# Patient Record
Sex: FEMALE | Race: WHITE | NOT HISPANIC OR LATINO
[De-identification: names, ages, dates, MRNs, and addresses within clinical notes are randomized per-mention and may not be internally consistent; named-entity substitution may affect disease eponyms.]

---

## 2021-03-25 ENCOUNTER — NON-APPOINTMENT (OUTPATIENT)
Age: 66
End: 2021-03-25

## 2021-03-26 PROBLEM — Z00.00 ENCOUNTER FOR PREVENTIVE HEALTH EXAMINATION: Status: ACTIVE | Noted: 2021-03-26

## 2021-04-12 ENCOUNTER — TRANSCRIPTION ENCOUNTER (OUTPATIENT)
Age: 66
End: 2021-04-12

## 2021-04-27 ENCOUNTER — NON-APPOINTMENT (OUTPATIENT)
Age: 66
End: 2021-04-27

## 2021-04-27 ENCOUNTER — APPOINTMENT (OUTPATIENT)
Dept: CARDIOLOGY | Facility: CLINIC | Age: 66
End: 2021-04-27
Payer: MEDICARE

## 2021-04-27 VITALS
BODY MASS INDEX: 24.93 KG/M2 | DIASTOLIC BLOOD PRESSURE: 60 MMHG | OXYGEN SATURATION: 86 % | HEIGHT: 66.5 IN | WEIGHT: 157 LBS | SYSTOLIC BLOOD PRESSURE: 140 MMHG | HEART RATE: 108 BPM

## 2021-04-27 DIAGNOSIS — R00.2 PALPITATIONS: ICD-10-CM

## 2021-04-27 PROCEDURE — 93242 EXT ECG>48HR<7D RECORDING: CPT

## 2021-04-27 PROCEDURE — 93000 ELECTROCARDIOGRAM COMPLETE: CPT

## 2021-04-27 PROCEDURE — 99204 OFFICE O/P NEW MOD 45 MIN: CPT | Mod: 25

## 2021-04-27 RX ORDER — IPRATROPIUM BROMIDE 0.5 MG/2.5ML
0.02 SOLUTION RESPIRATORY (INHALATION)
Refills: 0 | Status: ACTIVE | COMMUNITY

## 2021-04-27 RX ORDER — FLUTICASONE FUROATE, UMECLIDINIUM BROMIDE AND VILANTEROL TRIFENATATE 100; 62.5; 25 UG/1; UG/1; UG/1
100-62.5-25 POWDER RESPIRATORY (INHALATION)
Refills: 0 | Status: ACTIVE | COMMUNITY

## 2021-04-27 RX ORDER — PNV NO.95/FERROUS FUM/FOLIC AC 28MG-0.8MG
TABLET ORAL
Refills: 0 | Status: ACTIVE | COMMUNITY

## 2021-04-27 RX ORDER — PREDNISONE 5 MG/1
5 TABLET ORAL
Refills: 0 | Status: ACTIVE | COMMUNITY

## 2021-04-27 RX ORDER — ALBUTEROL SULFATE 90 UG/1
108 (90 BASE) AEROSOL, METERED RESPIRATORY (INHALATION)
Refills: 0 | Status: ACTIVE | COMMUNITY

## 2021-04-27 RX ORDER — LEVOTHYROXINE SODIUM 88 UG/1
88 TABLET ORAL
Refills: 0 | Status: ACTIVE | COMMUNITY

## 2021-04-27 RX ORDER — CHOLECALCIFEROL (VITAMIN D3) 25 MCG
TABLET ORAL
Refills: 0 | Status: ACTIVE | COMMUNITY

## 2021-04-27 NOTE — DISCUSSION/SUMMARY
[FreeTextEntry1] : Pt is a 64 y/o F who is referred here today by her pulmonologist for evaluation.  She has PMH COPD on O2, ambulates with wheelchair.  For the past few mnths she has been experiencing palpitations several times per day - with exertion and at rest, sometimes will wake her from sleep.  At times she feels dizzy.  She also mentions some chest tightness.  She denies CP, diaphoresis, syncope, LE edema, PND, orthopnea. \par When she gets dizzy she will increase her O2 to 4L and sometimes this helps\par She has baseline HUTCHISON due to her COPD\par \par Given pt's symptoms will check [facility] monitor to assess for ectopy.  Advised adequate hydration.  Drug use, OTC medication use, caffeine consumption reviewed \par Will check transthoracic echocardiogram to evaluate left ventricular function and assess for any structural abnormalities \par BP WNL\par WIll try to get recent labs done at endo office\par Pt will return in 3 mnths or sooner as needed but I encouraged communication via phone or portal if necessary. \par The described plan was discussed with the pt.  All questions and concerns were addressed to the best of my knowledge.

## 2021-04-27 NOTE — REVIEW OF SYSTEMS
[Negative] : Heme/Lymph [Dyspnea on exertion] : dyspnea during exertion [Palpitations] : palpitations

## 2021-04-27 NOTE — HISTORY OF PRESENT ILLNESS
[FreeTextEntry1] : Pt is a 66 y/o F who is referred here today by her pulmonologist for evaluation.  She has PMH COPD on O2, ambulates with wheelchair.  For the past few mnths she has been experiencing palpitations several times per day - with exertion and at rest, sometimes will wake her from sleep.  At times she feels dizzy.  She also mentions some chest tightness.  She denies CP, diaphoresis, syncope, LE edema, PND, orthopnea. \par When she gets dizzy she will increase her O2 to 4L and sometimes this helps\par She has baseline HUTCHISON due to her COPD\par \par COVID vaccine 04/2021 Moderna\par \par PMH: COPD 2011 on O2 24/7 2.5L, hypothyroid\par pulm Dr Braydon Lau\par endo Dr Tony Souza\par Family hx: no cardiac disease\par Smoking status: quit 2018\par no ETOH\par no drug use\par Current exercise: stretching\par Daily water intake: 48oz\par Daily caffeine intake: 1 cup coffee\par OTC medications: Vit D/B12, Mg, K\par NKDA\par Previous cardiac testing:stress test 9 yrs ago " she thinks it was normal"\par Previous hospitalizations: none\par 5 children - no problem with pregnancies\par

## 2021-04-27 NOTE — PHYSICAL EXAM
[Well Developed] : well developed [Well Nourished] : well nourished [No Acute Distress] : no acute distress [Normal Conjunctiva] : normal conjunctiva [Normal Venous Pressure] : normal venous pressure [No Carotid Bruit] : no carotid bruit [Normal S1, S2] : normal S1, S2 [No Murmur] : no murmur [No Rub] : no rub [No Gallop] : no gallop [No Respiratory Distress] : no respiratory distress  [Soft] : abdomen soft [Non Tender] : non-tender [No Masses/organomegaly] : no masses/organomegaly [Normal Bowel Sounds] : normal bowel sounds [Normal Gait] : normal gait [No Edema] : no edema [No Cyanosis] : no cyanosis [No Clubbing] : no clubbing [No Varicosities] : no varicosities [No Rash] : no rash [No Skin Lesions] : no skin lesions [Moves all extremities] : moves all extremities [No Focal Deficits] : no focal deficits [Normal Speech] : normal speech [Alert and Oriented] : alert and oriented [Normal memory] : normal memory [de-identified] : decreased breath sounds b/l

## 2021-05-11 ENCOUNTER — APPOINTMENT (OUTPATIENT)
Dept: CARDIOLOGY | Facility: CLINIC | Age: 66
End: 2021-05-11
Payer: MEDICARE

## 2021-05-11 PROCEDURE — 93306 TTE W/DOPPLER COMPLETE: CPT

## 2021-05-18 ENCOUNTER — TRANSCRIPTION ENCOUNTER (OUTPATIENT)
Age: 66
End: 2021-05-18

## 2021-05-18 PROCEDURE — 93244 EXT ECG>48HR<7D REV&INTERPJ: CPT

## 2021-08-11 ENCOUNTER — RESULT CHARGE (OUTPATIENT)
Age: 66
End: 2021-08-11

## 2021-08-11 ENCOUNTER — APPOINTMENT (OUTPATIENT)
Dept: CARDIOLOGY | Facility: CLINIC | Age: 66
End: 2021-08-11

## 2021-08-11 ENCOUNTER — APPOINTMENT (OUTPATIENT)
Dept: CARDIOLOGY | Facility: CLINIC | Age: 66
End: 2021-08-11
Payer: MEDICARE

## 2021-08-11 DIAGNOSIS — E03.9 HYPOTHYROIDISM, UNSPECIFIED: ICD-10-CM

## 2021-08-11 PROCEDURE — 99441: CPT | Mod: 95

## 2021-09-07 LAB
25(OH)D3 SERPL-MCNC: 57.4 NG/ML
ALBUMIN SERPL ELPH-MCNC: 4.4 G/DL
ALP BLD-CCNC: 94 U/L
ALT SERPL-CCNC: 10 U/L
ANION GAP SERPL CALC-SCNC: 12 MMOL/L
AST SERPL-CCNC: 16 U/L
BASOPHILS # BLD AUTO: 0.05 K/UL
BASOPHILS NFR BLD AUTO: 0.5 %
BILIRUB SERPL-MCNC: 0.3 MG/DL
BUN SERPL-MCNC: 13 MG/DL
CALCIUM SERPL-MCNC: 9.4 MG/DL
CHLORIDE SERPL-SCNC: 100 MMOL/L
CHOLEST SERPL-MCNC: 252 MG/DL
CO2 SERPL-SCNC: 32 MMOL/L
CREAT SERPL-MCNC: 0.6 MG/DL
EOSINOPHIL # BLD AUTO: 0.14 K/UL
EOSINOPHIL NFR BLD AUTO: 1.3 %
ESTIMATED AVERAGE GLUCOSE: 108 MG/DL
FOLATE SERPL-MCNC: 7 NG/ML
GLUCOSE SERPL-MCNC: 107 MG/DL
HBA1C MFR BLD HPLC: 5.4 %
HCT VFR BLD CALC: 43.4 %
HDLC SERPL-MCNC: 62 MG/DL
HGB BLD-MCNC: 12.5 G/DL
IMM GRANULOCYTES NFR BLD AUTO: 0.6 %
LDLC SERPL CALC-MCNC: 160 MG/DL
LYMPHOCYTES # BLD AUTO: 0.73 K/UL
LYMPHOCYTES NFR BLD AUTO: 7 %
MAGNESIUM SERPL-MCNC: 2.3 MG/DL
MAN DIFF?: NORMAL
MCHC RBC-ENTMCNC: 28.6 PG
MCHC RBC-ENTMCNC: 28.8 GM/DL
MCV RBC AUTO: 99.3 FL
MONOCYTES # BLD AUTO: 0.46 K/UL
MONOCYTES NFR BLD AUTO: 4.4 %
NEUTROPHILS # BLD AUTO: 8.96 K/UL
NEUTROPHILS NFR BLD AUTO: 86.2 %
NONHDLC SERPL-MCNC: 190 MG/DL
PLATELET # BLD AUTO: 343 K/UL
POTASSIUM SERPL-SCNC: 4.2 MMOL/L
PROT SERPL-MCNC: 6.5 G/DL
RBC # BLD: 4.37 M/UL
RBC # FLD: 14.3 %
SODIUM SERPL-SCNC: 144 MMOL/L
TRIGL SERPL-MCNC: 151 MG/DL
TSH SERPL-ACNC: 3.35 UIU/ML
VIT B12 SERPL-MCNC: 1306 PG/ML
WBC # FLD AUTO: 10.4 K/UL

## 2022-04-14 ENCOUNTER — APPOINTMENT (OUTPATIENT)
Dept: CARDIOLOGY | Facility: CLINIC | Age: 67
End: 2022-04-14
Payer: MEDICARE

## 2022-04-14 PROCEDURE — 99442: CPT | Mod: 95

## 2022-05-26 ENCOUNTER — APPOINTMENT (OUTPATIENT)
Dept: CARDIOLOGY | Facility: CLINIC | Age: 67
End: 2022-05-26
Payer: MEDICARE

## 2022-05-26 ENCOUNTER — NON-APPOINTMENT (OUTPATIENT)
Age: 67
End: 2022-05-26

## 2022-05-26 VITALS
WEIGHT: 153 LBS | HEART RATE: 101 BPM | BODY MASS INDEX: 24.3 KG/M2 | SYSTOLIC BLOOD PRESSURE: 140 MMHG | HEIGHT: 66.5 IN | DIASTOLIC BLOOD PRESSURE: 60 MMHG | OXYGEN SATURATION: 91 %

## 2022-05-26 DIAGNOSIS — I27.20 PULMONARY HYPERTENSION, UNSPECIFIED: ICD-10-CM

## 2022-05-26 DIAGNOSIS — J44.9 CHRONIC OBSTRUCTIVE PULMONARY DISEASE, UNSPECIFIED: ICD-10-CM

## 2022-05-26 PROCEDURE — 99214 OFFICE O/P EST MOD 30 MIN: CPT | Mod: 25

## 2022-05-26 PROCEDURE — 93000 ELECTROCARDIOGRAM COMPLETE: CPT

## 2022-05-26 RX ORDER — AZITHROMYCIN 250 MG/1
250 TABLET, FILM COATED ORAL
Refills: 0 | Status: ACTIVE | COMMUNITY
Start: 2022-05-26

## 2022-05-26 NOTE — HISTORY OF PRESENT ILLNESS
[FreeTextEntry1] : Pt is a 67 y/o F PMH COPD on O2 (4-4.5L), pHTN, ambulates with wheelchair.  She continues to get occasional palptiations, HUTCHISON, dizziness.  She denies CP, diaphoresis, syncope, LE edema, PND, orthopnea. \par Home 's/80's\par COVID vaccine 04/2021 Moderna\par \par TTE 05/2021 normal LV function, RVSP 52mmHg, min TR\par Rush 04/2021 NSR avg HR 90\par \par PMH: COPD 2011 on O2 24/7 2.5L, hypothyroid\par Pt does not have PCP\par pulm Dr Braydon Lau\par endo Dr Tony Souza\par Family hx: no cardiac disease\par Smoking status: quit 2018\par no ETOH\par no drug use\par Current exercise: stretching\par Daily water intake: 60oz\par Daily caffeine intake: 1 cup coffee\par OTC medications: Vit D/B12, Mg, K\par NKDA\par Previous hospitalizations: none\par 5 children - no problem with pregnancies\par

## 2022-05-26 NOTE — PHYSICAL EXAM
[Well Developed] : well developed [Well Nourished] : well nourished [No Acute Distress] : no acute distress [Normal Conjunctiva] : normal conjunctiva [Normal Venous Pressure] : normal venous pressure [No Carotid Bruit] : no carotid bruit [Normal S1, S2] : normal S1, S2 [No Murmur] : no murmur [No Rub] : no rub [No Gallop] : no gallop [No Respiratory Distress] : no respiratory distress  [Soft] : abdomen soft [Non Tender] : non-tender [No Masses/organomegaly] : no masses/organomegaly [Normal Bowel Sounds] : normal bowel sounds [Normal Gait] : normal gait [No Edema] : no edema [No Cyanosis] : no cyanosis [No Clubbing] : no clubbing [No Varicosities] : no varicosities [No Rash] : no rash [No Skin Lesions] : no skin lesions [Moves all extremities] : moves all extremities [No Focal Deficits] : no focal deficits [Normal Speech] : normal speech [Alert and Oriented] : alert and oriented [Normal memory] : normal memory [de-identified] : decreased breath sounds

## 2022-12-01 ENCOUNTER — APPOINTMENT (OUTPATIENT)
Dept: CARDIOLOGY | Facility: CLINIC | Age: 67
End: 2022-12-01

## 2023-03-02 ENCOUNTER — APPOINTMENT (OUTPATIENT)
Dept: CARDIOLOGY | Facility: CLINIC | Age: 68
End: 2023-03-02